# Patient Record
Sex: FEMALE | Race: BLACK OR AFRICAN AMERICAN | NOT HISPANIC OR LATINO | URBAN - METROPOLITAN AREA
[De-identification: names, ages, dates, MRNs, and addresses within clinical notes are randomized per-mention and may not be internally consistent; named-entity substitution may affect disease eponyms.]

---

## 2017-01-03 ENCOUNTER — EMERGENCY (EMERGENCY)
Facility: HOSPITAL | Age: 57
LOS: 1 days | Discharge: HOME | End: 2017-01-03
Attending: EMERGENCY MEDICINE | Admitting: EMERGENCY MEDICINE
Payer: COMMERCIAL

## 2017-01-03 VITALS
HEART RATE: 92 BPM | DIASTOLIC BLOOD PRESSURE: 91 MMHG | TEMPERATURE: 98 F | RESPIRATION RATE: 16 BRPM | OXYGEN SATURATION: 98 % | HEIGHT: 69 IN | WEIGHT: 220.02 LBS | SYSTOLIC BLOOD PRESSURE: 155 MMHG

## 2017-01-03 DIAGNOSIS — S80.12XA CONTUSION OF LEFT LOWER LEG, INITIAL ENCOUNTER: ICD-10-CM

## 2017-01-03 DIAGNOSIS — S89.92XA UNSPECIFIED INJURY OF LEFT LOWER LEG, INITIAL ENCOUNTER: ICD-10-CM
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
## 2017-01-03 DIAGNOSIS — I10 ESSENTIAL (PRIMARY) HYPERTENSION: ICD-10-CM
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
## 2017-01-03 PROCEDURE — 73590 X-RAY EXAM OF LOWER LEG: CPT | Mod: 26,LT

## 2017-01-03 PROCEDURE — 99283 EMERGENCY DEPT VISIT LOW MDM: CPT | Mod: 25

## 2017-01-03 PROCEDURE — 73590 X-RAY EXAM OF LOWER LEG: CPT

## 2017-01-03 RX ORDER — IBUPROFEN 200 MG
600 TABLET ORAL ONCE
Qty: 0 | Refills: 0 | Status: COMPLETED | OUTPATIENT
Start: 2017-01-03 | End: 2017-01-03

## 2017-01-03 RX ORDER — IBUPROFEN 200 MG
1 TABLET ORAL
Qty: 60 | Refills: 0 | OUTPATIENT
Start: 2017-01-03

## 2017-01-03 RX ORDER — TRAMADOL HYDROCHLORIDE 50 MG/1
1 TABLET ORAL
Qty: 12 | Refills: 0 | OUTPATIENT
Start: 2017-01-03

## 2017-01-03 RX ADMIN — Medication 600 MILLIGRAM(S): at 20:34

## 2017-01-03 NOTE — ED PROVIDER NOTE - OBJECTIVE STATEMENT
Patient is a 56 year old female who presents to ED c/o pain to the inside of her left leg just below the knee s/p having her leg get [place] between the subway platform and car while exiting the car this evening. Pt stepped off the train and stepped into the gap. Bystanders helped her up. Able to bear weight but has significant swelling. Denies head trauma, foot pain, coolness, increased warmth, back pain, paresthesias.

## 2017-01-03 NOTE — ED PROVIDER NOTE - DETAILS:
Case was discussed with me: 55 yo female with h/o minor trauma to lower extremity, lower ext swelling, bruising, no signs of compartment syndrome or hematoma, no h/o blood thinners, xray nl. No h/o blood clot, no concern for DVT given no risk factors and h/o trauma. Will treat pain, ice, follow up with PMD.

## 2017-01-03 NOTE — ED PROVIDER NOTE - MEDICAL DECISION MAKING DETAILS
56 year old female with leg contusion today. Xray negative for fracture. Pt given percocet/motrin for pain. Swelling does not extend circumferentially. Good pulses distally, normal sensation. No clinical indication of compartment syndrome at this time. Pt advised to return immediately for worsening of symptoms. RICE.

## 2017-01-03 NOTE — ED ADULT NURSE NOTE - PMH
<<----- Click to add NO pertinent Past Medical History No pertinent past medical history HTN (hypertension)

## 2017-01-03 NOTE — ED PROVIDER NOTE - CONSTITUTIONAL, MLM
normal... Well appearing, well nourished, awake, alert, oriented to person, place, time/situation and in mild painful distress.

## 2017-01-06 PROBLEM — Z00.00 ENCOUNTER FOR PREVENTIVE HEALTH EXAMINATION: Status: ACTIVE | Noted: 2017-01-06

## 2017-01-18 ENCOUNTER — APPOINTMENT (OUTPATIENT)
Dept: ORTHOPEDIC SURGERY | Facility: CLINIC | Age: 57
End: 2017-01-18

## 2017-02-20 PROBLEM — I10 ESSENTIAL (PRIMARY) HYPERTENSION: Chronic | Status: ACTIVE | Noted: 2017-01-03

## 2020-04-26 ENCOUNTER — MESSAGE (OUTPATIENT)
Age: 60
End: 2020-04-26

## 2020-10-13 ENCOUNTER — RESULT REVIEW (OUTPATIENT)
Age: 60
End: 2020-10-13

## 2023-02-28 NOTE — ED PROVIDER NOTE - PMH
HTN (hypertension) Alar Island Pedicle Flap Text: The defect edges were debeveled with a #15 scalpel blade.  Given the location of the defect, shape of the defect and the proximity to the alar rim an island pedicle advancement flap was deemed most appropriate.  Using a sterile surgical marker, an appropriate advancement flap was drawn incorporating the defect, outlining the appropriate donor tissue and placing the expected incisions within the nasal ala running parallel to the alar rim. The area thus outlined was incised with a #15 scalpel blade.  The skin margins were undermined minimally to an appropriate distance in all directions around the primary defect and laterally outward around the island pedicle utilizing iris scissors.  There was minimal undermining beneath the pedicle flap.

## 2024-08-28 NOTE — ED ADULT NURSE NOTE - DISCHARGE TEACHING
Intubation    Date/Time: 8/28/2024 2:37 PM    Performed by: Baljinder Jimenez CRNA  Authorized by: Christina Rangel MD    Intubation:     Induction:  Intravenous    Intubated:  Postinduction    Mask Ventilation:  Easy with oral airway    Attempts:  1    Attempted By:  CRNA    Difficult Airway Encountered?: No      Complications:  None    Airway Device:  Supraglottic airway/LMA    Airway Device Size:  3.0    Style/Cuff Inflation:  Cuffed    Secured at:  The lips    Placement Verified By:  Capnometry    Complicating Factors:  None       Pt educated on prescribed medications and side effects.